# Patient Record
Sex: FEMALE | Race: WHITE | NOT HISPANIC OR LATINO | Employment: FULL TIME | URBAN - METROPOLITAN AREA
[De-identification: names, ages, dates, MRNs, and addresses within clinical notes are randomized per-mention and may not be internally consistent; named-entity substitution may affect disease eponyms.]

---

## 2024-11-26 ENCOUNTER — OFFICE VISIT (OUTPATIENT)
Dept: PRIMARY CARE | Facility: EXTERNAL LOCATION | Age: 24
End: 2024-11-26

## 2024-11-26 VITALS
SYSTOLIC BLOOD PRESSURE: 145 MMHG | OXYGEN SATURATION: 100 % | DIASTOLIC BLOOD PRESSURE: 92 MMHG | HEART RATE: 104 BPM | TEMPERATURE: 98.1 F

## 2024-11-26 DIAGNOSIS — J06.9 VIRAL UPPER RESPIRATORY TRACT INFECTION: ICD-10-CM

## 2024-11-26 DIAGNOSIS — J02.9 PHARYNGITIS, UNSPECIFIED ETIOLOGY: Primary | ICD-10-CM

## 2024-11-26 LAB — POC RAPID STREP: NEGATIVE

## 2024-11-26 ASSESSMENT — ENCOUNTER SYMPTOMS
EYE ITCHING: 0
RHINORRHEA: 0
EYE PAIN: 0
PSYCHIATRIC NEGATIVE: 1
FEVER: 0
SINUS PRESSURE: 1
WHEEZING: 0
DIAPHORESIS: 0
SHORTNESS OF BREATH: 0
VOICE CHANGE: 0
FATIGUE: 1
COUGH: 1
EYE REDNESS: 0
APPETITE CHANGE: 0
SORE THROAT: 1
HEADACHES: 0
SINUS PAIN: 0
MYALGIAS: 0
CHILLS: 0
NAUSEA: 0
ACTIVITY CHANGE: 0
DIARRHEA: 0
VOMITING: 0

## 2024-11-26 NOTE — PROGRESS NOTES
Subjective   Patient ID: Leisa Lantigua is a 24 y.o. female who presents for Sore Throat (Nasal congestion/mild cough).    Pt presents with c/o sore throat and nasal congestion. Would like to r/o strep throat before the holidays. Rapid strep negative. Pt states she has white coat syndrome. BP elevated IH today. PND.       URI   This is a new problem. Associated symptoms include congestion, coughing and a sore throat. Pertinent negatives include no chest pain, diarrhea, ear pain, headaches, nausea, plugged ear sensation, rhinorrhea, sinus pain, sneezing, vomiting or wheezing. Treatments tried: dayquil, nyquil.        Review of Systems   Constitutional:  Positive for fatigue. Negative for activity change, appetite change, chills, diaphoresis and fever.   HENT:  Positive for congestion, postnasal drip, sinus pressure and sore throat. Negative for ear pain, rhinorrhea, sinus pain, sneezing and voice change.    Eyes:  Negative for pain, redness and itching.   Respiratory:  Positive for cough. Negative for shortness of breath and wheezing.    Cardiovascular:  Negative for chest pain.   Gastrointestinal:  Negative for diarrhea, nausea and vomiting.   Genitourinary:  Negative for decreased urine volume.   Musculoskeletal:  Negative for myalgias.   Neurological:  Negative for headaches.   Psychiatric/Behavioral: Negative.         Objective   BP (!) 167/91   Pulse 104   Temp 36.7 °C (98.1 °F)   SpO2 100%     Physical Exam  Vitals and nursing note reviewed.   Constitutional:       General: She is not in acute distress.     Appearance: Normal appearance.   HENT:      Head: Normocephalic.      Right Ear: Tympanic membrane, ear canal and external ear normal. There is no impacted cerumen.      Left Ear: Tympanic membrane, ear canal and external ear normal. There is no impacted cerumen.      Nose: Rhinorrhea present. No congestion.      Mouth/Throat:      Mouth: Mucous membranes are moist.      Pharynx: Oropharynx is clear. Posterior  oropharyngeal erythema present. No oropharyngeal exudate.   Eyes:      Conjunctiva/sclera: Conjunctivae normal.   Cardiovascular:      Rate and Rhythm: Normal rate and regular rhythm.      Heart sounds: No murmur heard.  Pulmonary:      Effort: Pulmonary effort is normal. No respiratory distress.      Breath sounds: Normal breath sounds. No stridor. No wheezing, rhonchi or rales.   Lymphadenopathy:      Cervical: No cervical adenopathy.   Skin:     General: Skin is warm and dry.   Neurological:      General: No focal deficit present.      Mental Status: She is alert. Mental status is at baseline.   Psychiatric:         Mood and Affect: Mood normal.         Behavior: Behavior normal.         Thought Content: Thought content normal.         Judgment: Judgment normal.         Assessment/Plan   Diagnoses and all orders for this visit:  Pharyngitis, unspecified etiology  -     POCT Rapid Strep A manually resulted  Viral upper respiratory tract infection       -pt instructed to check BP at home Q am x7 days and follow up with PCP.   -education provided regarding likely viral illness  -discussed possible OTC medications to help with s/s control  Upper respiratory infection-    Get plenty of rest and maintain hydration.    May use humidification in sleeping areas.  Use saline nasal spray to thin mucous.   May use throat lozenges, salt water gargles, or chloraseptic spray as needed to relieve sore throat.    OTC Cold Medications:  Tylenol/Ibuprofen- pain control and fever management  Dextromethorphan - cough suppressant  Decongestants - dry up mucus - pseudoephedrine (stronger, but more side effects) and phenylephrine - be cautious if high blood pressure  Flonase- nasal steroid to improve nasal inflammation.    Use good hand hygiene to prevent the spread of germs.    Follow up if symptoms persist greater than 7 days.  Seek emergency medical care if fever above 104F, difficulty swallowing, or difficulty breathing occurs.